# Patient Record
Sex: MALE | Race: WHITE
[De-identification: names, ages, dates, MRNs, and addresses within clinical notes are randomized per-mention and may not be internally consistent; named-entity substitution may affect disease eponyms.]

---

## 2019-01-24 ENCOUNTER — HOSPITAL ENCOUNTER (OUTPATIENT)
Dept: HOSPITAL 7 - FB.SDS | Age: 69
Discharge: HOME | End: 2019-01-24
Attending: SURGERY
Payer: MEDICARE

## 2019-01-24 DIAGNOSIS — Z12.11: Primary | ICD-10-CM

## 2019-01-24 DIAGNOSIS — Z80.0: ICD-10-CM

## 2019-01-24 DIAGNOSIS — Z87.891: ICD-10-CM

## 2019-01-24 DIAGNOSIS — K63.5: ICD-10-CM

## 2019-01-24 DIAGNOSIS — I10: ICD-10-CM

## 2019-01-24 DIAGNOSIS — K21.9: ICD-10-CM

## 2019-01-24 DIAGNOSIS — K57.30: ICD-10-CM

## 2019-01-24 DIAGNOSIS — E11.9: ICD-10-CM

## 2019-01-24 DIAGNOSIS — Z86.010: ICD-10-CM

## 2019-01-24 DIAGNOSIS — Z79.899: ICD-10-CM

## 2019-01-24 DIAGNOSIS — Z79.84: ICD-10-CM

## 2019-01-24 NOTE — PCM.OPNOTE
- General Post-Op/Procedure Note


Date of Surgery/Procedure: 01/24/19


Operative Procedure(s): c scope with bx


Findings: 





sigmoid diverticulosis


sigmoid polyp 





Pre Op Diagnosis: screening


Post-Op Diagnosis: sigmoid diverticulosis.  sigmoid polyp


Anesthesia Technique: MAC


Primary Surgeon: Liban Casas


Anesthesia Provider: Miguel Haider


Pathology: 





polyp





Complications: None


Condition: Good


Free Text/Narrative:: 





see dictation

## 2019-01-24 NOTE — OR
DATE OF OPERATION:  01/24/2019

 

SURGEON:  Liban Casas MD

 

PROCEDURE PERFORMED:  Colonoscopy with cold forceps biopsy.

 

PREOPERATIVE DIAGNOSIS:  Colon cancer screening.

 

POSTOPERATIVE DIAGNOSIS:  Sigmoid diverticulosis and sigmoid polyp.

 

INDICATIONS FOR PROCEDURE:  This is a 68-year-old white male who presents for a

screening colonoscopy, he was offered and accepted same.

 

DESCRIPTION OF OPERATION:  After an excellent IV sedation was administered,

digital rectal exam was performed.  No marked abnormality was noted.  Flexible

colonoscope was inserted and advanced to the cecum.  Prep was excellent.  The

following findings were noted:  Ascending colon, unremarkable.  Transverse

colon, unremarkable.  Descending colon, unremarkable.  Sigmoid, scattered

diverticula, small polypoid lesion, had the appearance of hyperplasia, biopsied

and sent for permanent.  Rectum and anus, unremarkable.  Colon was deflated.

Scope was removed. Patient tolerated procedure.  Results by letter.

 

Job#: [id number]

DD: 01/24/2019 0830

DT: 01/24/2019 1157 AS/HASEEB

## 2020-02-26 NOTE — EDM.PDOC
ED HPI GENERAL MEDICAL PROBLEM





- General


Chief Complaint: General


Stated Complaint: FELL ON ICE, LEFT RIB PAIN


Time Seen by Provider: 02/26/20 16:05


Source of Information: Reports: Patient


History Limitations: Reports: No Limitations





- History of Present Illness


INITIAL COMMENTS - FREE TEXT/NARRATIVE: 





Patient presented to the ED because of left rib pain. He tripped and fell and 

hit his head on the ice. There is no LOC,no N/V but he c/o left rib pain.


  ** left ribcage


Pain Score (Numeric/FACES): 5





- Related Data


 Allergies











Allergy/AdvReac Type Severity Reaction Status Date / Time


 


No Known Allergies Allergy   Verified 01/18/19 14:11











Home Meds: 


 Home Meds





Aspirin [Lo-Dose Aspirin EC] 81 mg PO DAILY 01/18/19 [History]


Fenofibrate Nanocrystallized [Fenofibrate] 48 mg PO DAILY 01/18/19 [History]


Ibuprofen [Motrin] 200 mg PO ASDIRECTED 01/18/19 [History]


Simvastatin 20 mg PO BEDTIME 01/18/19 [History]


Valsartan/Hydrochlorothiazide [Valsartan-Hctz 320-25 mg Tab] 1 ea PO DAILY 01/18 /19 [History]


atenoloL [Tenormin] 100 mg PO DAILY 01/18/19 [History]


metFORMIN [Glucophage] 500 mg PO BID 01/18/19 [History]


Omeprazole 20 mg PO DAILY 01/24/19 [History]


Acetaminophen/HYDROcodone [Norco 325-5 MG] 1 - 2 tab PO Q6H PRN #15 tab 02/26/ 20 [Rx]


Cyclobenzaprine [Flexeril] 10 mg PO TID PRN #30 tab 02/26/20 [Rx]











Past Medical History


HEENT History: Reports: Impaired Vision


Cardiovascular History: Reports: Hypertension


Respiratory History: Reports: None


Gastrointestinal History: Reports: Colon Polyp


Genitourinary History: Reports: None


Musculoskeletal History: Reports: Back Pain, Chronic


Neurological History: Reports: None


Psychiatric History: Reports: Addiction


Other Psychiatric History: ALCOHOLISM


Endocrine/Metabolic History: Reports: Diabetes, Type II


Immunologic History: Reports: None


Oncologic (Cancer) History: Reports: None


Dermatologic History: Reports: None





- Infectious Disease History


Infectious Disease History: Reports: Chicken Pox, Measles, Mumps





- Past Surgical History


Head Surgeries/Procedures: Reports: None


GI Surgical History: Reports: Colonoscopy





Social & Family History





- Family History


GI: Reports: Colon Polyps


Other GI Family History: FATHER HAD COLON CANCER.





- Tobacco Use


Smoking Status *Q: Heavy Tobacco Smoker


Years of Tobacco use: 40


Packs/Tins Daily: 0.5





- Caffeine Use


Caffeine Use: Reports: Coffee





- Recreational Drug Use


Recreational Drug Use: No





ED ROS GENERAL





- Review of Systems


Review Of Systems: See Below


Constitutional: Reports: No Symptoms


HEENT: Reports: No Symptoms


Respiratory: Reports: No Symptoms


Cardiovascular: Reports: No Symptoms


Endocrine: Reports: No Symptoms


GI/Abdominal: Reports: No Symptoms


: Reports: No Symptoms


Musculoskeletal: Reports: Other (rib pain)


Skin: Reports: No Symptoms


Neurological: Reports: No Symptoms


Psychiatric: Reports: No Symptoms





ED EXAM, GENERAL





- Physical Exam


Exam: See Below


Exam Limited By: No Limitations


General Appearance: Alert, No Apparent Distress


Eye Exam: Bilateral Eye: PERRL


Ears: Normal External Exam, Normal Canal


Nose: Normal Inspection, Normal Mucosa, No Blood


Throat/Mouth: Normal Inspection, Normal Lips, Normal Teeth, Normal Gums


Head: Atraumatic, Normocephalic


Neck: Normal Inspection, Supple, Non-Tender


Respiratory/Chest: No Respiratory Distress, Lungs Clear, Normal Breath Sounds, 

Other (tenderness over the left rib area-5th-8th rib)


Cardiovascular: Normal Peripheral Pulses, Regular Rate, Rhythm, No Edema, No 

Gallop


GI/Abdominal: Normal Bowel Sounds, Soft, Non-Tender, No Organomegaly, No 

Distention, No Abnormal Bruit


Back Exam: Normal Inspection


Extremities: Normal Inspection, Normal Range of Motion, Non-Tender, Other (

tenderness over the thoracic and lumbar spine)


Neurological: Alert, Oriented, CN II-XII Intact


Psychiatric: Normal Affect, Normal Mood


Skin Exam: Warm, Dry, Intact





Course





- Vital Signs


Text/Narrative:: 


radiology result was discussed with patient and his spouse


tramadol 50 mg and tylenol 1000 mg po x1 dose


flexeril 10 mg po x1


Last Recorded V/S: 


 Last Vital Signs











Temp  36.6 C   02/26/20 16:00


 


Pulse  62   02/26/20 16:00


 


Resp  16   02/26/20 16:00


 


BP  185/80 H  02/26/20 16:00


 


Pulse Ox  100   02/26/20 16:00














- Orders/Labs/Meds


Orders: 


 Active Orders 24 hr











 Category Date Time Status


 


 Head wo Cont [CT] Stat Exams  02/26/20 16:51 Taken


 


 Lumbar Spine 2 or 3V [CR] Stat Exams  02/26/20 16:51 Taken


 


 Ribs 2V w Chest Lt [CR] Stat Exams  02/26/20 17:55 Taken


 


 Thoracic Spine 3V [CR] Stat Exams  02/26/20 16:51 Taken











Meds: 


Medications














Discontinued Medications














Generic Name Dose Route Start Last Admin





  Trade Name Freq  PRN Reason Stop Dose Admin


 


Acetaminophen  1,000 mg  02/26/20 16:54  02/26/20 17:23





  Tylenol Extra Strength  PO  02/26/20 16:55  1,000 mg





  ONETIME ONE   Administration





     





     





     





     


 


Cyclobenzaprine HCl  10 mg  02/26/20 16:54  02/26/20 17:24





  Flexeril  PO  02/26/20 16:55  10 mg





  ONETIME ONE   Administration





     





     





     





     


 


Tramadol HCl  50 mg  02/26/20 16:54  02/26/20 17:23





  Ultram  PO  02/26/20 16:55  50 mg





  ONETIME ONE   Administration





     





     





     





     














Departure





- Departure


Time of Disposition: 17:05


Disposition: Home, Self-Care 01


Condition: Good


Clinical Impression: 


 Rib fracture, Muscle strain








- Discharge Information


Prescriptions: 


Acetaminophen/HYDROcodone [Norco 325-5 MG] 1 - 2 tab PO Q6H PRN #15 tab


 PRN Reason: Pain


Cyclobenzaprine [Flexeril] 10 mg PO TID PRN #30 tab


 PRN Reason: Spasms


Instructions:  Muscle Strain, Easy-to-Read, Chest Wall Pain


Referrals: 


Chioma Aponte, NP [Primary Care Provider] - 


Forms:  ED Department Discharge


Additional Instructions: 


please read discharge instructions on rib fractures


apply ice or heat whichever makes the pain feel better


take hydrocodone, 1- 2 tablets every 4-6 hours as needed for pain


flexeril 5 mg  3 times daily as needed for muscle spasm


follow up next week





Sepsis Event Note





- Evaluation


Sepsis Screening Result: No Definite Risk





- Focused Exam


Date Exam was Performed: 02/27/20


Time Exam was Performed: 10:09





- My Orders


Last 24 Hours: 


My Active Orders





02/26/20 16:51


Head wo Cont [CT] Stat 


Lumbar Spine 2 or 3V [CR] Stat 


Thoracic Spine 3V [CR] Stat 





02/26/20 17:55


Ribs 2V w Chest Lt [CR] Stat 














- Assessment/Plan


Last 24 Hours: 


My Active Orders





02/26/20 16:51


Head wo Cont [CT] Stat 


Lumbar Spine 2 or 3V [CR] Stat 


Thoracic Spine 3V [CR] Stat 





02/26/20 17:55


Ribs 2V w Chest Lt [CR] Stat